# Patient Record
Sex: FEMALE | Race: WHITE | NOT HISPANIC OR LATINO | URBAN - METROPOLITAN AREA
[De-identification: names, ages, dates, MRNs, and addresses within clinical notes are randomized per-mention and may not be internally consistent; named-entity substitution may affect disease eponyms.]

---

## 2018-07-03 PROBLEM — M54.16 LUMBAR RADICULOPATHY: Status: ACTIVE | Noted: 2018-07-03

## 2018-07-03 PROBLEM — Z12.39 BREAST CANCER SCREENING: Status: ACTIVE | Noted: 2018-07-03

## 2018-07-03 PROBLEM — Z13.820 OSTEOPOROSIS SCREENING: Status: ACTIVE | Noted: 2018-07-03

## 2018-07-03 PROBLEM — R42 VERTIGO: Status: ACTIVE | Noted: 2018-07-03

## 2018-07-03 PROBLEM — E78.2 MIXED HYPERLIPIDEMIA: Status: ACTIVE | Noted: 2018-07-03

## 2018-07-03 PROBLEM — E78.49 OTHER HYPERLIPIDEMIA: Status: ACTIVE | Noted: 2018-07-03

## 2018-07-03 PROBLEM — F17.200 SMOKER: Status: ACTIVE | Noted: 2018-07-03

## 2018-07-03 PROBLEM — M19.90 OSTEOARTHRITIS: Status: ACTIVE | Noted: 2018-07-03

## 2018-07-03 PROCEDURE — 82570 ASSAY OF URINE CREATININE: CPT | Performed by: INTERNAL MEDICINE

## 2018-07-03 PROCEDURE — 82043 UR ALBUMIN QUANTITATIVE: CPT | Performed by: INTERNAL MEDICINE

## 2018-07-05 PROBLEM — L98.9 SKIN LESION: Status: ACTIVE | Noted: 2018-07-05

## 2019-07-09 PROBLEM — M54.16 LUMBAR RADICULOPATHY: Status: RESOLVED | Noted: 2018-07-03 | Resolved: 2019-07-09

## 2019-07-09 PROBLEM — R42 VERTIGO: Status: RESOLVED | Noted: 2018-07-03 | Resolved: 2019-07-09

## 2019-09-04 PROBLEM — D03.62: Status: ACTIVE | Noted: 2019-09-04

## 2019-10-08 PROBLEM — R42 VERTIGO: Status: ACTIVE | Noted: 2018-07-03

## 2019-10-08 PROBLEM — R73.03 PREDIABETES: Status: ACTIVE | Noted: 2019-10-08

## 2019-10-08 PROBLEM — R03.0 ELEVATED BLOOD PRESSURE READING WITHOUT DIAGNOSIS OF HYPERTENSION: Status: ACTIVE | Noted: 2019-10-08

## 2020-08-10 PROBLEM — C44.90 SKIN CANCER: Status: ACTIVE | Noted: 2019-01-01

## 2020-08-10 PROBLEM — Z85.820 HISTORY OF MELANOMA: Status: ACTIVE | Noted: 2020-08-10

## 2021-09-25 ENCOUNTER — APPOINTMENT (OUTPATIENT)
Dept: URGENT CARE | Age: 71
End: 2021-09-25

## 2021-09-25 ENCOUNTER — IMMUNIZATION (OUTPATIENT)
Dept: URGENT CARE | Age: 71
End: 2021-09-25

## 2021-09-25 DIAGNOSIS — Z23 NEED FOR INFLUENZA VACCINATION: Primary | ICD-10-CM

## 2021-09-25 PROCEDURE — 90662 IIV NO PRSV INCREASED AG IM: CPT | Performed by: OBSTETRICS & GYNECOLOGY

## 2021-09-25 PROCEDURE — G0008 ADMIN INFLUENZA VIRUS VAC: HCPCS | Performed by: OBSTETRICS & GYNECOLOGY

## 2021-10-07 ENCOUNTER — HOSPITAL ENCOUNTER (OUTPATIENT)
Age: 71
Discharge: HOME | End: 2021-10-07
Payer: MEDICARE

## 2021-10-07 VITALS
HEART RATE: 72 BPM | TEMPERATURE: 99 F | SYSTOLIC BLOOD PRESSURE: 148 MMHG | BODY MASS INDEX: 28.09 KG/M2 | DIASTOLIC BLOOD PRESSURE: 61 MMHG | RESPIRATION RATE: 18 BRPM | HEIGHT: 67 IN | OXYGEN SATURATION: 97 % | WEIGHT: 179 LBS

## 2021-10-07 DIAGNOSIS — R30.0 DYSURIA: Primary | ICD-10-CM

## 2021-10-07 PROCEDURE — 99204 OFFICE O/P NEW MOD 45 MIN: CPT

## 2021-10-07 PROCEDURE — 81002 URINALYSIS NONAUTO W/O SCOPE: CPT | Performed by: NURSE PRACTITIONER

## 2021-10-07 PROCEDURE — 87086 URINE CULTURE/COLONY COUNT: CPT | Performed by: NURSE PRACTITIONER

## 2021-10-07 RX ORDER — PHENAZOPYRIDINE HYDROCHLORIDE 200 MG/1
200 TABLET, FILM COATED ORAL 3 TIMES DAILY PRN
Qty: 6 TABLET | Refills: 0 | Status: SHIPPED | OUTPATIENT
Start: 2021-10-07 | End: 2021-10-14

## 2021-10-07 RX ORDER — CEPHALEXIN 500 MG/1
500 CAPSULE ORAL 2 TIMES DAILY
Qty: 14 CAPSULE | Refills: 0 | Status: SHIPPED | OUTPATIENT
Start: 2021-10-07 | End: 2021-10-14

## 2021-10-07 NOTE — ED PROVIDER NOTES
Patient Seen in: [facility]      History   Patient presents with:  Urinary Symptoms    Stated Complaint: uti    Subjective:   HPI  45-year-old female with history of UTIs presents immediate care complaining of dysuria as well as urinary u 98.9 °F (37.2 °C)   Temp src Temporal   SpO2 97 %   O2 Device None (Room air)       Current:/61   Pulse 72   Temp 98.9 °F (37.2 °C) (Temporal)   Resp 18   Ht 170.2 cm (5' 7\")   Wt 81.2 kg   SpO2 97%   BMI 28.04 kg/m²         Physical Exam  Vitals an 0

## 2021-12-09 ENCOUNTER — HOSPITAL ENCOUNTER (OUTPATIENT)
Age: 71
Discharge: HOME | End: 2021-12-09
Payer: MEDICARE

## 2021-12-09 VITALS
HEART RATE: 70 BPM | DIASTOLIC BLOOD PRESSURE: 57 MMHG | OXYGEN SATURATION: 97 % | TEMPERATURE: 98 F | WEIGHT: 178 LBS | HEIGHT: 67 IN | SYSTOLIC BLOOD PRESSURE: 132 MMHG | BODY MASS INDEX: 27.94 KG/M2 | RESPIRATION RATE: 18 BRPM

## 2021-12-09 DIAGNOSIS — Z20.822 ENCOUNTER FOR LABORATORY TESTING FOR COVID-19 VIRUS: Primary | ICD-10-CM

## 2021-12-09 PROCEDURE — 99212 OFFICE O/P EST SF 10 MIN: CPT

## 2021-12-09 NOTE — ED PROVIDER NOTES
Patient Seen in: [facility]      History   Patient presents with:  Covid-19 Test    Stated Complaint: exposed to covid    Subjective: This a 24-year-old female below stated medical history.   Presents to immediate care for Covid–19 testi Negative for environmental allergies. Neurological: Negative for headaches. Hematological: Does not bruise/bleed easily. Positive for stated complaint: exposed to covid  Other systems are as noted in HPI. Constitutional and vital signs reviewed. Findings: No rash. Neurological:      Mental Status: She is alert and oriented to person, place, and time.    Psychiatric:         Mood and Affect: Mood normal.         Behavior: Behavior normal.               ED Course     Labs Reviewed   RAPID SARS-COV-

## 2021-12-09 NOTE — ED INITIAL ASSESSMENT (HPI)
Requesting Covid test. Exposed to an individual on Saturday with positive Covid infection. Denies Covid symptoms.

## 2022-02-27 ENCOUNTER — HOSPITAL ENCOUNTER (OUTPATIENT)
Age: 72
Discharge: HOME | End: 2022-02-27
Payer: MEDICARE

## 2022-02-27 ENCOUNTER — APPOINTMENT (OUTPATIENT)
Dept: CT IMAGING | Age: 72
End: 2022-02-27
Attending: PHYSICIAN ASSISTANT
Payer: MEDICARE

## 2022-02-27 VITALS
HEIGHT: 66 IN | HEART RATE: 68 BPM | TEMPERATURE: 98 F | WEIGHT: 174 LBS | BODY MASS INDEX: 27.97 KG/M2 | RESPIRATION RATE: 16 BRPM | SYSTOLIC BLOOD PRESSURE: 137 MMHG | DIASTOLIC BLOOD PRESSURE: 54 MMHG | OXYGEN SATURATION: 97 %

## 2022-02-27 DIAGNOSIS — K08.89 PAIN, DENTAL: Primary | ICD-10-CM

## 2022-02-27 LAB
#MXD IC: 1 X10ˆ3/UL (ref 0.1–1)
BUN BLD-MCNC: 12 MG/DL (ref 7–18)
CHLORIDE BLD-SCNC: 108 MMOL/L (ref 98–112)
CO2 BLD-SCNC: 24 MMOL/L (ref 21–32)
CREAT BLD-MCNC: 0.7 MG/DL
GLUCOSE BLD-MCNC: 100 MG/DL (ref 70–99)
HCT VFR BLD AUTO: 33.9 %
HCT VFR BLD CALC: 32 %
HGB BLD-MCNC: 11.3 G/DL
ISTAT IONIZED CALCIUM FOR CHEM 8: 1.23 MMOL/L (ref 1.12–1.32)
LYMPHOCYTES # BLD AUTO: 1.4 X10ˆ3/UL (ref 1–4)
LYMPHOCYTES NFR BLD AUTO: 17.1 %
MCH RBC QN AUTO: 32.3 PG (ref 26–34)
MCHC RBC AUTO-ENTMCNC: 33.3 G/DL (ref 31–37)
MCV RBC AUTO: 96.9 FL (ref 80–100)
MIXED CELL %: 12.3 %
NEUTROPHILS # BLD AUTO: 5.6 X10ˆ3/UL (ref 1.5–7.7)
NEUTROPHILS NFR BLD AUTO: 70.6 %
PLATELET # BLD AUTO: 152 X10ˆ3/UL (ref 150–450)
POTASSIUM BLD-SCNC: 4.5 MMOL/L (ref 3.6–5.1)
RBC # BLD AUTO: 3.5 X10ˆ6/UL
SODIUM BLD-SCNC: 141 MMOL/L (ref 136–145)
WBC # BLD AUTO: 8 X10ˆ3/UL (ref 4–11)

## 2022-02-27 PROCEDURE — 80047 BASIC METABLC PNL IONIZED CA: CPT

## 2022-02-27 PROCEDURE — 99213 OFFICE O/P EST LOW 20 MIN: CPT

## 2022-02-27 PROCEDURE — 85025 COMPLETE CBC W/AUTO DIFF WBC: CPT | Performed by: PHYSICIAN ASSISTANT

## 2022-02-27 PROCEDURE — 36415 COLL VENOUS BLD VENIPUNCTURE: CPT

## 2022-02-27 NOTE — ED INITIAL ASSESSMENT (HPI)
2 weeks ago, pt had some left lower tooth pain/abscess. Saw dentist. Pain increased and her dentist placed her on Amoxicillin and T3. Saw endodontist and was told to increase the frequency of Amoxicillin. Pt has taken 3 days of Amoxicillin. Now has left lower jaw pain, difficult to fully open her mouth, has difficulty swallowing and has a sore throat. Pt unable to eat x 2-3 days.

## 2024-10-29 RX ORDER — MELATONIN
1000 DAILY
COMMUNITY

## 2024-10-29 RX ORDER — FAMOTIDINE 20 MG/1
20 TABLET, FILM COATED ORAL AS NEEDED
COMMUNITY

## 2024-10-29 RX ORDER — OLMESARTAN MEDOXOMIL 5 MG/1
5 TABLET ORAL DAILY
COMMUNITY

## 2024-10-29 NOTE — PAT NURSING NOTE
Per PAT encounter/MyChart message sent to pt/took notes as well (addendum-new note sent with changes in bold):    PreOp Instructions     You are scheduled for: a Cardiac Procedure     Date of Procedure: 10/31/24 Thursday     Diet Instructions: Do not eat or drink anything after midnight including gum, mints, candy, etc.     Medications: Medications you are allowed to take can be taken with a sip of water the morning of your procedure, Take Aspirin 81 mg x 4 tablets the day of your procedure     Medications to Stop: Hold herbal supplements and vitamins morning of the procedure 10/31.     Diabetic Instructions: Metformin needs to be held 24 hours prior to procedure, your last dose should be the morning dose the day before procedure.     Skin Prep : Shower with antibacterial soap using a clean washcloth, prior to procedure. Once dried off, no lotions/powders/creams/ointments, etc., Do not shave the procedure area, this will be completed at the hospital during the preparation phase.     Arrival Time: The day prior to your procedure (Wednesday) you will receive a phone call before 6:00 pm with your arrival time. If you haven't received a phone call, please check your voicemail messages., If you did not receive a voice mail and it is after 6:00 pm, please call the nursing supervisor at [phone].    Driving After Procedure: Sedation will be given so you WILL NOT be able to drive home. You will need a responsible adult  to drive you home. You can NOT take uber or taxi unless approved by your physician in advance.     Discharge Teaching: Your nurse will give you specific instructions before discharge, Most people can resume normal activities in 2-3 days, Any questions, please call the physician's office      parking is available starting at 6 am or park in the [place] parking garage at [facility]. Check in at the [facility] reception desk. Our  will be there to check you in for  your procedure. Please bring your insurance cards and ID with you.                                                                                                                                      Please DO NOT respond to this message, the inbasket is not monitored for messages. For any questions, please call the physician's office.

## 2024-10-31 ENCOUNTER — HOSPITAL ENCOUNTER (OUTPATIENT)
Dept: INTERVENTIONAL RADIOLOGY/VASCULAR | Facility: HOSPITAL | Age: 74
Discharge: HOME | End: 2024-10-31
Attending: INTERNAL MEDICINE | Admitting: INTERNAL MEDICINE
Payer: MEDICARE

## 2024-10-31 VITALS
DIASTOLIC BLOOD PRESSURE: 65 MMHG | OXYGEN SATURATION: 98 % | HEART RATE: 61 BPM | RESPIRATION RATE: 17 BRPM | SYSTOLIC BLOOD PRESSURE: 140 MMHG | TEMPERATURE: 97 F | HEIGHT: 66 IN | WEIGHT: 210 LBS | BODY MASS INDEX: 33.75 KG/M2

## 2024-10-31 DIAGNOSIS — R94.39 ABNORMAL STRESS TEST: ICD-10-CM

## 2024-10-31 LAB — GLUCOSE BLD-MCNC: 97 MG/DL (ref 70–99)

## 2024-10-31 PROCEDURE — 93458 L HRT ARTERY/VENTRICLE ANGIO: CPT | Performed by: INTERNAL MEDICINE

## 2024-10-31 PROCEDURE — 99153 MOD SED SAME PHYS/QHP EA: CPT | Performed by: INTERNAL MEDICINE

## 2024-10-31 PROCEDURE — 4A023N7 MEASUREMENT OF CARDIAC SAMPLING AND PRESSURE, LEFT HEART, PERCUTANEOUS APPROACH: ICD-10-PCS | Performed by: INTERNAL MEDICINE

## 2024-10-31 PROCEDURE — B2151ZZ FLUOROSCOPY OF LEFT HEART USING LOW OSMOLAR CONTRAST: ICD-10-PCS | Performed by: INTERNAL MEDICINE

## 2024-10-31 PROCEDURE — 82962 GLUCOSE BLOOD TEST: CPT

## 2024-10-31 PROCEDURE — 99152 MOD SED SAME PHYS/QHP 5/>YRS: CPT | Performed by: INTERNAL MEDICINE

## 2024-10-31 PROCEDURE — B2111ZZ FLUOROSCOPY OF MULTIPLE CORONARY ARTERIES USING LOW OSMOLAR CONTRAST: ICD-10-PCS | Performed by: INTERNAL MEDICINE

## 2024-10-31 RX ORDER — VERAPAMIL HYDROCHLORIDE 2.5 MG/ML
INJECTION, SOLUTION INTRAVENOUS
Status: COMPLETED
Start: 2024-10-31 | End: 2024-10-31

## 2024-10-31 RX ORDER — SODIUM CHLORIDE 9 MG/ML
INJECTION, SOLUTION INTRAVENOUS
Status: DISCONTINUED | OUTPATIENT
Start: 2024-11-01 | End: 2024-11-01

## 2024-10-31 RX ORDER — LIDOCAINE HYDROCHLORIDE 10 MG/ML
INJECTION, SOLUTION EPIDURAL; INFILTRATION; INTRACAUDAL; PERINEURAL
Status: COMPLETED
Start: 2024-10-31 | End: 2024-10-31

## 2024-10-31 RX ORDER — NITROGLYCERIN 20 MG/100ML
INJECTION INTRAVENOUS
Status: COMPLETED
Start: 2024-10-31 | End: 2024-10-31

## 2024-10-31 RX ORDER — MIDAZOLAM HYDROCHLORIDE 1 MG/ML
INJECTION INTRAMUSCULAR; INTRAVENOUS
Status: COMPLETED
Start: 2024-10-31 | End: 2024-10-31

## 2024-10-31 RX ORDER — HEPARIN SODIUM 5000 [USP'U]/ML
INJECTION, SOLUTION INTRAVENOUS; SUBCUTANEOUS
Status: COMPLETED
Start: 2024-10-31 | End: 2024-10-31

## 2024-10-31 NOTE — PROCEDURES
[facility]       Diane Jean Location: Cath Lab    CSN [id number] MRN [id number]   Admission Date 10/31/2024 Procedure Date 10/31/2024   Attending Physician Rosangela Marx MD Procedure Physician Washington Maza MD         CARDIAC CATHETERIZATION REPORT     PREOPERATIVE DIAGNOSIS:  chest pain, abnormal nuclear stress test- apical reversible defect  POSTOPERATIVE DIAGNOSIS:  mild non-obstructive CAD.  PROCEDURE PERFORMED:  left heart catheterization, left ventriculogram, selective coronary angiography      PROCEDURE:  The patient was brought to the cardiac catheterization lab in the fasting state.  Informed consent was obtained.  Moderate sedation was employed using a total of IV Versed 3mg and IV fentanyl 75mcg.  I directly observed the patient from 0708 to 0737, for a total of 29 minutes, and an independent trained observer was present and assisted in the monitoring of the patient's level of consciousness and physiological status, watching the heart rate, blood pressure, oximetry, and rhythm, in addition to total moderation time.      ACCESS/CATHETER PLACEMENT:   The right radial area was prepped and draped in a sterile manner and anesthetized with 2% lidocaine.  The right radial artery was accessed, and a 6-Khmer, 11 cm sheath was placed.  Left and right selective coronary angiography was performed using a 6F Tiger4.0 catheter.  A pigtail catheter was used to cross the aortic valve, measure left ventricular pressure and perform a 30 degree BEAULIEU ventriculogram.  The catheter was pulled across the valve to assess for aortic stenosis.  At the conclusion of the study, the right radial artery hemostasis was performed using a radial band.       FINDINGS:      1.  Left heart catheterization:    Left ventricle: 148/18 mHg  Aorta: 141/62/93 mmHg  Left ventriculogram demonstrated a LV ejection fraction of 60-65% without mitral regurgitation; there are no regional wall motion abnormalities.  There was no aortic  stenosis upon pullback of the catheter.    2.  Selective coronary angiography:      Left main artery: The left main artery is a medium size, trifurcating vessel without significant angiographic disease.     LAD:  The left anterior descending artery is a medium size vessel that extends to the apex and gives rise to a small mid diagonal.  There is mild 30% plaque noted in the proximal LAD.    LCx: The left circumflex artery is a medium size vessel that gives rise to a mid obtuse marginal.  The ramus is a medium size vessel. Mild luminal irregularities.     RCA:  The right coronary artery is a medium size dominant vessel that gives rise to a medium rPDA.  Mild 20% plaque noted proximally.      MEDICATIONS:  See nursing record.     COMPLICATIONS:  No major complications were observed during this visit to the catheterization lab.     IMPRESSION:    1.  Left heart catheterization: LVEDP 18mmHg, no aortic stenosis.  LVEF 60-65% with normal wall motion, no mitral regurgitation  2.  Coronary angiography:  right dominant system  - LM:  no significant angiographic disease  - LAD: 30% proximal narrowing  - LCx: mild luminal irregularities  - RCA: 20% proximal narrowing    RECOMMENDATIONS: No angiographic indication for coronary revascularization.

## 2024-10-31 NOTE — DISCHARGE INSTRUCTIONS
?    HOME CARE INSTRUCTIONS    Activity:   DO NOT drive within the first 24 hours after the procedure. You may resume driving late the following day according to the nurse or physician's instructions   Plan on resting and relaxing today and tomorrow  Resume your normal activity after 48 hours, or as instructed by your physician   Do not lift anything over 10 pounds for the next 7 days   Avoid sexual activity for the next 24 hours   Avoid drinking alcohol for the next 24 hours   If the groin site was used, avoid repeated stair use and excessive walking for the next 24 hours   If the wrist was used, avoid bending/flexing of the wrist for the next 24 hours  What is Normal:   A small lump (roughly 2-3 cm) at the procedure site associated with mild tenderness when touched that may last up to a few weeks, but should progressively improve  The procedure site may be bruised or discolored   There may be a small amount of drainage on the bandage   Special Instructions:   Drink plenty of fluids during the next 24 hours to “flush” the contrast from your system   The bandage is to remain in place for 24 hours   Keep the bandage clean and dry      If bleeding or swelling should occur, apply manual pressure directly to the site and call the  Physician    ? Notify the physician if the patient experiences loss of sensation  ? For minor discomfort, patients may take an over-the-counter analgesic such as Tylenol or  may use an ice pack if swelling is present  DO NOT submerge the procedure site for 7 days (no bath tubs or pools)   This includes dishwashing/submersion of the wrist, if the wrist was used   After 24 hours, you must remove the bandage   Ok to shower and wash the procedure site gently with soap and water   Wearing a bandage is not necessary. If you choose to wear a bandage for a few days, make sure it remains clean and dry and that it is changed daily

## 2024-10-31 NOTE — PLAN OF CARE
Patient had C today with Dr. Maza. Right wrist access site with TR band in place with 12cc air instilled. Site is CDI. Patient denies any numbness or tingling to right hand/fingers. Patient has good O2 pleth on right hand. VSS. Patient denies any pain. Patient's daughter @ bedside. Patient tolerating po intake. Air intermittently released from TR band until all air removed. TR band removed. Site is soft, CDI, +2 radial pulse. Occlusive dressing applied. Patient completed recovery time. Discharge instructions reviewed. IV D/C'd. Patient discharged to [facility] by wheelchair with belongings. Patient's daughter is .

## 2024-11-04 ENCOUNTER — PATIENT OUTREACH (OUTPATIENT)
Dept: CASE MANAGEMENT | Age: 74
End: 2024-11-04

## 2024-11-04 NOTE — PROGRESS NOTES
Hospital follow up.    [facility]  [phone]    Attempt #1:  Left message on voicemail for patient to call transitions specialist back to schedule follow up appointments. Provided Transitions specialist scheduling phone number [phone].

## 2024-11-05 NOTE — PROGRESS NOTES
Hospital follow up.    [facility]  [phone]    Attempt #2:  Left message on voicemail for patient to call transitions specialist back to schedule follow up appointments. Provided Transitions specialist scheduling phone number [phone].

## 2024-11-05 NOTE — PROGRESS NOTES
Hospital follow up.  Returning patient's voice message.    [facility]  [phone]  Patient wishes to follow up with cardiology.    [facility] will contact the patient to schedule.  Confirmed with [facility].    Closing encounter.